# Patient Record
Sex: MALE | Race: WHITE | Employment: OTHER | URBAN - METROPOLITAN AREA
[De-identification: names, ages, dates, MRNs, and addresses within clinical notes are randomized per-mention and may not be internally consistent; named-entity substitution may affect disease eponyms.]

---

## 2017-04-27 ENCOUNTER — HOSPITAL ENCOUNTER (EMERGENCY)
Age: 57
Discharge: HOME | End: 2017-04-27
Payer: COMMERCIAL

## 2017-04-27 VITALS
SYSTOLIC BLOOD PRESSURE: 104 MMHG | OXYGEN SATURATION: 98 % | WEIGHT: 246 LBS | BODY MASS INDEX: 33.32 KG/M2 | DIASTOLIC BLOOD PRESSURE: 63 MMHG | HEART RATE: 77 BPM | RESPIRATION RATE: 16 BRPM | HEIGHT: 72 IN | TEMPERATURE: 97.9 F

## 2017-04-27 DIAGNOSIS — R11.2 NON-INTRACTABLE VOMITING WITH NAUSEA, UNSPECIFIED VOMITING TYPE: Primary | ICD-10-CM

## 2017-04-27 DIAGNOSIS — E86.0 DEHYDRATION: ICD-10-CM

## 2017-04-27 LAB
ANION GAP SERPL CALCULATED.3IONS-SCNC: 13 MEQ/L (ref 7–13)
BASOPHILS ABSOLUTE: 0.1 K/UL (ref 0–0.2)
BASOPHILS RELATIVE PERCENT: 0.8 %
BUN BLDV-MCNC: 14 MG/DL (ref 6–20)
CALCIUM SERPL-MCNC: 9.3 MG/DL (ref 8.6–10.2)
CHLORIDE BLD-SCNC: 101 MEQ/L (ref 98–107)
CK MB: 2.1 NG/ML (ref 0–6.7)
CO2: 26 MEQ/L (ref 22–29)
CREAT SERPL-MCNC: 1 MG/DL (ref 0.7–1.2)
CREATINE KINASE-MB INDEX: 1 % (ref 0–3.5)
EKG ATRIAL RATE: 71 BPM
EKG P AXIS: 37 DEGREES
EKG P-R INTERVAL: 210 MS
EKG Q-T INTERVAL: 420 MS
EKG QRS DURATION: 86 MS
EKG QTC CALCULATION (BAZETT): 456 MS
EKG R AXIS: 112 DEGREES
EKG T AXIS: 96 DEGREES
EKG VENTRICULAR RATE: 71 BPM
EOSINOPHILS ABSOLUTE: 0.3 K/UL (ref 0–0.7)
EOSINOPHILS RELATIVE PERCENT: 2.8 %
GFR AFRICAN AMERICAN: >60
GFR NON-AFRICAN AMERICAN: >60
GLUCOSE BLD-MCNC: 182 MG/DL (ref 74–109)
HCT VFR BLD CALC: 48.4 % (ref 42–52)
HEMOGLOBIN: 16.5 G/DL (ref 14–18)
LIPASE: >600 U/L (ref 13–60)
LYMPHOCYTES ABSOLUTE: 4.1 K/UL (ref 1–4.8)
LYMPHOCYTES RELATIVE PERCENT: 37.1 %
MCH RBC QN AUTO: 31.1 PG (ref 27–31.3)
MCHC RBC AUTO-ENTMCNC: 34.2 % (ref 33–37)
MCV RBC AUTO: 91.1 FL (ref 80–100)
MONOCYTES ABSOLUTE: 1.3 K/UL (ref 0.2–0.8)
MONOCYTES RELATIVE PERCENT: 11.5 %
NEUTROPHILS ABSOLUTE: 5.3 K/UL (ref 1.4–6.5)
NEUTROPHILS RELATIVE PERCENT: 47.8 %
PDW BLD-RTO: 13.2 % (ref 11.5–14.5)
PLATELET # BLD: 246 K/UL (ref 130–400)
POTASSIUM SERPL-SCNC: 3.6 MEQ/L (ref 3.5–5.1)
RBC # BLD: 5.31 M/UL (ref 4.7–6.1)
SODIUM BLD-SCNC: 140 MEQ/L (ref 132–144)
TOTAL CK: 213 U/L (ref 0–190)
TROPONIN: <0.01 NG/ML (ref 0–0.01)
WBC # BLD: 11 K/UL (ref 4.8–10.8)

## 2017-04-27 PROCEDURE — 93005 ELECTROCARDIOGRAM TRACING: CPT

## 2017-04-27 PROCEDURE — 96361 HYDRATE IV INFUSION ADD-ON: CPT

## 2017-04-27 PROCEDURE — 99284 EMERGENCY DEPT VISIT MOD MDM: CPT

## 2017-04-27 PROCEDURE — 36415 COLL VENOUS BLD VENIPUNCTURE: CPT

## 2017-04-27 PROCEDURE — 83690 ASSAY OF LIPASE: CPT

## 2017-04-27 PROCEDURE — 6360000002 HC RX W HCPCS: Performed by: EMERGENCY MEDICINE

## 2017-04-27 PROCEDURE — 2580000003 HC RX 258: Performed by: PHYSICIAN ASSISTANT

## 2017-04-27 PROCEDURE — 82553 CREATINE MB FRACTION: CPT

## 2017-04-27 PROCEDURE — 2580000003 HC RX 258: Performed by: EMERGENCY MEDICINE

## 2017-04-27 PROCEDURE — 84484 ASSAY OF TROPONIN QUANT: CPT

## 2017-04-27 PROCEDURE — 85025 COMPLETE CBC W/AUTO DIFF WBC: CPT

## 2017-04-27 PROCEDURE — 80048 BASIC METABOLIC PNL TOTAL CA: CPT

## 2017-04-27 PROCEDURE — 82550 ASSAY OF CK (CPK): CPT

## 2017-04-27 PROCEDURE — 96374 THER/PROPH/DIAG INJ IV PUSH: CPT

## 2017-04-27 RX ORDER — SODIUM CHLORIDE 0.9 % (FLUSH) 0.9 %
3 SYRINGE (ML) INJECTION EVERY 8 HOURS
Status: DISCONTINUED | OUTPATIENT
Start: 2017-04-27 | End: 2017-04-27 | Stop reason: HOSPADM

## 2017-04-27 RX ORDER — LOSARTAN POTASSIUM 25 MG/1
25 TABLET ORAL DAILY
COMMUNITY

## 2017-04-27 RX ORDER — 0.9 % SODIUM CHLORIDE 0.9 %
1500 INTRAVENOUS SOLUTION INTRAVENOUS ONCE
Status: COMPLETED | OUTPATIENT
Start: 2017-04-27 | End: 2017-04-27

## 2017-04-27 RX ORDER — ONDANSETRON 2 MG/ML
4 INJECTION INTRAMUSCULAR; INTRAVENOUS ONCE
Status: COMPLETED | OUTPATIENT
Start: 2017-04-27 | End: 2017-04-27

## 2017-04-27 RX ORDER — ONDANSETRON 4 MG/1
4 TABLET, FILM COATED ORAL EVERY 8 HOURS PRN
Qty: 10 TABLET | Refills: 0 | Status: SHIPPED | OUTPATIENT
Start: 2017-04-27

## 2017-04-27 RX ORDER — 0.9 % SODIUM CHLORIDE 0.9 %
1000 INTRAVENOUS SOLUTION INTRAVENOUS ONCE
Status: COMPLETED | OUTPATIENT
Start: 2017-04-27 | End: 2017-04-27

## 2017-04-27 RX ADMIN — SODIUM CHLORIDE 1000 ML: 9 INJECTION, SOLUTION INTRAVENOUS at 06:22

## 2017-04-27 RX ADMIN — ONDANSETRON 4 MG: 2 INJECTION, SOLUTION INTRAMUSCULAR; INTRAVENOUS at 06:20

## 2017-04-27 RX ADMIN — SODIUM CHLORIDE 1500 ML: 9 INJECTION, SOLUTION INTRAVENOUS at 06:20

## 2017-04-27 ASSESSMENT — ENCOUNTER SYMPTOMS
RHINORRHEA: 0
SORE THROAT: 0
DIARRHEA: 1
ABDOMINAL PAIN: 0
SHORTNESS OF BREATH: 0
WHEEZING: 0
EYE DISCHARGE: 0
ABDOMINAL DISTENTION: 0
VOMITING: 1
COLOR CHANGE: 0
STRIDOR: 0
COUGH: 0
CONSTIPATION: 0
BACK PAIN: 0
CHOKING: 0
NAUSEA: 1

## 2020-01-13 ENCOUNTER — APPOINTMENT (OUTPATIENT)
Dept: GENERAL RADIOLOGY | Age: 60
DRG: 871 | End: 2020-01-13
Payer: MEDICARE

## 2020-01-13 ENCOUNTER — HOSPITAL ENCOUNTER (INPATIENT)
Age: 60
LOS: 1 days | Discharge: HOME | DRG: 871 | End: 2020-01-14
Attending: INTERNAL MEDICINE | Admitting: INTERNAL MEDICINE
Payer: MEDICARE

## 2020-01-13 ENCOUNTER — APPOINTMENT (OUTPATIENT)
Dept: CT IMAGING | Age: 60
DRG: 871 | End: 2020-01-13
Payer: MEDICARE

## 2020-01-13 PROBLEM — J18.9 PNEUMONIA: Status: ACTIVE | Noted: 2020-01-13

## 2020-01-13 LAB
ALBUMIN SERPL-MCNC: 2.9 G/DL (ref 3.5–4.6)
ALP BLD-CCNC: 61 U/L (ref 35–104)
ALT SERPL-CCNC: 42 U/L (ref 0–41)
ANION GAP SERPL CALCULATED.3IONS-SCNC: 13 MEQ/L (ref 9–15)
AST SERPL-CCNC: 65 U/L (ref 0–40)
BASOPHILS ABSOLUTE: 0 K/UL (ref 0–0.2)
BASOPHILS RELATIVE PERCENT: 0.3 %
BILIRUB SERPL-MCNC: 0.8 MG/DL (ref 0.2–0.7)
BUN BLDV-MCNC: 19 MG/DL (ref 6–20)
CALCIUM SERPL-MCNC: 8.1 MG/DL (ref 8.5–9.9)
CHLORIDE BLD-SCNC: 93 MEQ/L (ref 95–107)
CO2: 29 MEQ/L (ref 20–31)
CREAT SERPL-MCNC: 1.06 MG/DL (ref 0.7–1.2)
EKG ATRIAL RATE: 91 BPM
EKG P AXIS: 53 DEGREES
EKG P-R INTERVAL: 200 MS
EKG Q-T INTERVAL: 366 MS
EKG QRS DURATION: 82 MS
EKG QTC CALCULATION (BAZETT): 450 MS
EKG R AXIS: 116 DEGREES
EKG T AXIS: 44 DEGREES
EKG VENTRICULAR RATE: 91 BPM
EOSINOPHILS ABSOLUTE: 0 K/UL (ref 0–0.7)
EOSINOPHILS RELATIVE PERCENT: 0 %
GFR AFRICAN AMERICAN: >60
GFR NON-AFRICAN AMERICAN: >60
GLOBULIN: 3.5 G/DL (ref 2.3–3.5)
GLUCOSE BLD-MCNC: 251 MG/DL (ref 60–115)
GLUCOSE BLD-MCNC: 265 MG/DL (ref 70–99)
GLUCOSE BLD-MCNC: 366 MG/DL (ref 60–115)
HCT VFR BLD CALC: 51.3 % (ref 42–52)
HEMOGLOBIN: 17.5 G/DL (ref 14–18)
INFLUENZA A BY PCR: POSITIVE
INFLUENZA B BY PCR: NEGATIVE
LACTIC ACID: 2.3 MMOL/L (ref 0.5–2.2)
LACTIC ACID: 2.8 MMOL/L (ref 0.5–2.2)
LYMPHOCYTES ABSOLUTE: 0.7 K/UL (ref 1–4.8)
LYMPHOCYTES RELATIVE PERCENT: 14 %
MAGNESIUM: 1.9 MG/DL (ref 1.7–2.4)
MCH RBC QN AUTO: 31.3 PG (ref 27–31.3)
MCHC RBC AUTO-ENTMCNC: 34 % (ref 33–37)
MCV RBC AUTO: 92.1 FL (ref 80–100)
MONOCYTES ABSOLUTE: 0.4 K/UL (ref 0.2–0.8)
MONOCYTES RELATIVE PERCENT: 8.2 %
NEUTROPHILS ABSOLUTE: 4 K/UL (ref 1.4–6.5)
NEUTROPHILS RELATIVE PERCENT: 77.5 %
PDW BLD-RTO: 13.5 % (ref 11.5–14.5)
PERFORMED ON: ABNORMAL
PERFORMED ON: ABNORMAL
PLATELET # BLD: 102 K/UL (ref 130–400)
POTASSIUM SERPL-SCNC: 3.9 MEQ/L (ref 3.4–4.9)
RBC # BLD: 5.57 M/UL (ref 4.7–6.1)
REASON FOR REJECTION: NORMAL
REJECTED TEST: NORMAL
SODIUM BLD-SCNC: 135 MEQ/L (ref 135–144)
TOTAL PROTEIN: 6.4 G/DL (ref 6.3–8)
TROPONIN: <0.01 NG/ML (ref 0–0.01)
WBC # BLD: 5.1 K/UL (ref 4.8–10.8)

## 2020-01-13 PROCEDURE — 71045 X-RAY EXAM CHEST 1 VIEW: CPT

## 2020-01-13 PROCEDURE — 6360000002 HC RX W HCPCS: Performed by: PHYSICIAN ASSISTANT

## 2020-01-13 PROCEDURE — 96368 THER/DIAG CONCURRENT INF: CPT

## 2020-01-13 PROCEDURE — 93005 ELECTROCARDIOGRAM TRACING: CPT | Performed by: EMERGENCY MEDICINE

## 2020-01-13 PROCEDURE — 96372 THER/PROPH/DIAG INJ SC/IM: CPT

## 2020-01-13 PROCEDURE — 6370000000 HC RX 637 (ALT 250 FOR IP): Performed by: PHYSICIAN ASSISTANT

## 2020-01-13 PROCEDURE — 2580000003 HC RX 258: Performed by: PHYSICIAN ASSISTANT

## 2020-01-13 PROCEDURE — 96365 THER/PROPH/DIAG IV INF INIT: CPT

## 2020-01-13 PROCEDURE — 96375 TX/PRO/DX INJ NEW DRUG ADDON: CPT

## 2020-01-13 PROCEDURE — 36415 COLL VENOUS BLD VENIPUNCTURE: CPT

## 2020-01-13 PROCEDURE — 87502 INFLUENZA DNA AMP PROBE: CPT

## 2020-01-13 PROCEDURE — 99285 EMERGENCY DEPT VISIT HI MDM: CPT

## 2020-01-13 PROCEDURE — 85025 COMPLETE CBC W/AUTO DIFF WBC: CPT

## 2020-01-13 PROCEDURE — 83735 ASSAY OF MAGNESIUM: CPT

## 2020-01-13 PROCEDURE — 70450 CT HEAD/BRAIN W/O DYE: CPT

## 2020-01-13 PROCEDURE — G0378 HOSPITAL OBSERVATION PER HR: HCPCS

## 2020-01-13 PROCEDURE — 84484 ASSAY OF TROPONIN QUANT: CPT

## 2020-01-13 PROCEDURE — 80053 COMPREHEN METABOLIC PANEL: CPT

## 2020-01-13 PROCEDURE — 94664 DEMO&/EVAL PT USE INHALER: CPT

## 2020-01-13 PROCEDURE — 2580000003 HC RX 258: Performed by: INTERNAL MEDICINE

## 2020-01-13 PROCEDURE — 6370000000 HC RX 637 (ALT 250 FOR IP): Performed by: INTERNAL MEDICINE

## 2020-01-13 PROCEDURE — 87040 BLOOD CULTURE FOR BACTERIA: CPT

## 2020-01-13 PROCEDURE — 6360000002 HC RX W HCPCS: Performed by: INTERNAL MEDICINE

## 2020-01-13 PROCEDURE — 83605 ASSAY OF LACTIC ACID: CPT

## 2020-01-13 PROCEDURE — 1210000000 HC MED SURG R&B

## 2020-01-13 PROCEDURE — 96374 THER/PROPH/DIAG INJ IV PUSH: CPT

## 2020-01-13 RX ORDER — IPRATROPIUM BROMIDE AND ALBUTEROL SULFATE 2.5; .5 MG/3ML; MG/3ML
1 SOLUTION RESPIRATORY (INHALATION)
Status: DISCONTINUED | OUTPATIENT
Start: 2020-01-14 | End: 2020-01-13

## 2020-01-13 RX ORDER — IPRATROPIUM BROMIDE AND ALBUTEROL SULFATE 2.5; .5 MG/3ML; MG/3ML
1 SOLUTION RESPIRATORY (INHALATION) EVERY 4 HOURS PRN
Status: DISCONTINUED | OUTPATIENT
Start: 2020-01-13 | End: 2020-01-14 | Stop reason: HOSPADM

## 2020-01-13 RX ORDER — DEXTROSE MONOHYDRATE 25 G/50ML
12.5 INJECTION, SOLUTION INTRAVENOUS PRN
Status: DISCONTINUED | OUTPATIENT
Start: 2020-01-13 | End: 2020-01-14 | Stop reason: HOSPADM

## 2020-01-13 RX ORDER — SODIUM CHLORIDE 0.9 % (FLUSH) 0.9 %
10 SYRINGE (ML) INJECTION PRN
Status: DISCONTINUED | OUTPATIENT
Start: 2020-01-13 | End: 2020-01-14 | Stop reason: HOSPADM

## 2020-01-13 RX ORDER — SODIUM CHLORIDE, SODIUM LACTATE, POTASSIUM CHLORIDE, CALCIUM CHLORIDE 600; 310; 30; 20 MG/100ML; MG/100ML; MG/100ML; MG/100ML
INJECTION, SOLUTION INTRAVENOUS CONTINUOUS
Status: DISCONTINUED | OUTPATIENT
Start: 2020-01-13 | End: 2020-01-14

## 2020-01-13 RX ORDER — ACETAMINOPHEN 500 MG
1000 TABLET ORAL ONCE
Status: COMPLETED | OUTPATIENT
Start: 2020-01-13 | End: 2020-01-13

## 2020-01-13 RX ORDER — ACETAMINOPHEN 325 MG/1
650 TABLET ORAL EVERY 4 HOURS PRN
Status: DISCONTINUED | OUTPATIENT
Start: 2020-01-13 | End: 2020-01-14 | Stop reason: HOSPADM

## 2020-01-13 RX ORDER — GUAIFENESIN 600 MG/1
600 TABLET, EXTENDED RELEASE ORAL 2 TIMES DAILY
Status: DISCONTINUED | OUTPATIENT
Start: 2020-01-13 | End: 2020-01-14 | Stop reason: HOSPADM

## 2020-01-13 RX ORDER — NICOTINE POLACRILEX 4 MG
15 LOZENGE BUCCAL PRN
Status: DISCONTINUED | OUTPATIENT
Start: 2020-01-13 | End: 2020-01-14 | Stop reason: HOSPADM

## 2020-01-13 RX ORDER — AZITHROMYCIN 250 MG/1
250 TABLET, FILM COATED ORAL DAILY
Status: DISCONTINUED | OUTPATIENT
Start: 2020-01-14 | End: 2020-01-13

## 2020-01-13 RX ORDER — SODIUM CHLORIDE 0.9 % (FLUSH) 0.9 %
10 SYRINGE (ML) INJECTION EVERY 12 HOURS SCHEDULED
Status: DISCONTINUED | OUTPATIENT
Start: 2020-01-13 | End: 2020-01-14 | Stop reason: HOSPADM

## 2020-01-13 RX ORDER — DEXTROSE MONOHYDRATE 50 MG/ML
100 INJECTION, SOLUTION INTRAVENOUS PRN
Status: DISCONTINUED | OUTPATIENT
Start: 2020-01-13 | End: 2020-01-14 | Stop reason: HOSPADM

## 2020-01-13 RX ORDER — OSELTAMIVIR PHOSPHATE 75 MG/1
75 CAPSULE ORAL 2 TIMES DAILY
Status: DISCONTINUED | OUTPATIENT
Start: 2020-01-13 | End: 2020-01-14 | Stop reason: HOSPADM

## 2020-01-13 RX ORDER — ONDANSETRON 2 MG/ML
4 INJECTION INTRAMUSCULAR; INTRAVENOUS EVERY 6 HOURS PRN
Status: DISCONTINUED | OUTPATIENT
Start: 2020-01-13 | End: 2020-01-14 | Stop reason: HOSPADM

## 2020-01-13 RX ORDER — 0.9 % SODIUM CHLORIDE 0.9 %
1000 INTRAVENOUS SOLUTION INTRAVENOUS ONCE
Status: COMPLETED | OUTPATIENT
Start: 2020-01-13 | End: 2020-01-14

## 2020-01-13 RX ORDER — BENZONATATE 100 MG/1
100 CAPSULE ORAL 3 TIMES DAILY PRN
Status: DISCONTINUED | OUTPATIENT
Start: 2020-01-13 | End: 2020-01-14 | Stop reason: HOSPADM

## 2020-01-13 RX ORDER — KETOROLAC TROMETHAMINE 30 MG/ML
30 INJECTION, SOLUTION INTRAMUSCULAR; INTRAVENOUS ONCE
Status: COMPLETED | OUTPATIENT
Start: 2020-01-13 | End: 2020-01-13

## 2020-01-13 RX ORDER — ASPIRIN 81 MG/1
81 TABLET ORAL DAILY
Status: DISCONTINUED | OUTPATIENT
Start: 2020-01-13 | End: 2020-01-14 | Stop reason: HOSPADM

## 2020-01-13 RX ADMIN — OSELTAMIVIR PHOSPHATE 75 MG: 75 CAPSULE ORAL at 20:49

## 2020-01-13 RX ADMIN — ENOXAPARIN SODIUM 40 MG: 40 INJECTION SUBCUTANEOUS at 20:49

## 2020-01-13 RX ADMIN — SODIUM CHLORIDE 1000 ML: 9 INJECTION, SOLUTION INTRAVENOUS at 15:54

## 2020-01-13 RX ADMIN — SODIUM CHLORIDE, POTASSIUM CHLORIDE, SODIUM LACTATE AND CALCIUM CHLORIDE: 600; 310; 30; 20 INJECTION, SOLUTION INTRAVENOUS at 20:55

## 2020-01-13 RX ADMIN — AZITHROMYCIN DIHYDRATE 500 MG: 500 INJECTION, POWDER, LYOPHILIZED, FOR SOLUTION INTRAVENOUS at 18:03

## 2020-01-13 RX ADMIN — GUAIFENESIN 600 MG: 600 TABLET, EXTENDED RELEASE ORAL at 20:49

## 2020-01-13 RX ADMIN — Medication 10 ML: at 20:50

## 2020-01-13 RX ADMIN — ASPIRIN 81 MG: 81 TABLET, COATED ORAL at 20:48

## 2020-01-13 RX ADMIN — CEFTRIAXONE SODIUM 1 G: 1 INJECTION, POWDER, FOR SOLUTION INTRAMUSCULAR; INTRAVENOUS at 17:39

## 2020-01-13 RX ADMIN — KETOROLAC TROMETHAMINE 30 MG: 30 INJECTION, SOLUTION INTRAMUSCULAR at 15:52

## 2020-01-13 RX ADMIN — ACETAMINOPHEN 1000 MG: 500 TABLET ORAL at 15:52

## 2020-01-13 ASSESSMENT — PAIN SCALES - GENERAL
PAINLEVEL_OUTOF10: 0
PAINLEVEL_OUTOF10: 8
PAINLEVEL_OUTOF10: 4

## 2020-01-13 ASSESSMENT — ENCOUNTER SYMPTOMS
APNEA: 0
BACK PAIN: 0
VOICE CHANGE: 0
COUGH: 1
ABDOMINAL DISTENTION: 0
EYE DISCHARGE: 0
ANAL BLEEDING: 0
NAUSEA: 0
PHOTOPHOBIA: 0
VOMITING: 0

## 2020-01-13 NOTE — H&P
Hospital Medicine  History and Physical    Patient:  Krys Simpson  MRN: [id number]    CHIEF COMPLAINT:    Chief Complaint   Patient presents with    Fatigue     recent flu dx, not improving       History Obtained From:  Patient, EMR  Primary Care Physician: Prakash Gautam    HISTORY OF PRESENT ILLNESS:   This is a 52-year-old male with a past medical history of type 2 diabetes and hypertension who presents with fatigue, shortness of breath, and persistent cough. Patient was seen almost a week ago at urgent care and was worked up for influenza and was negative at that time. Patient states his symptoms started that day with myalgias, cough, nasal congestion, and extreme fatigue. He states that his son was sick at home a few days prior to him becoming ill. States he has not recovered since that time. Today he was brought in by wife because he had a syncopal episode at home. Patient was trying to get something out of the refrigerator and became lightheaded and dizzy and passed out. He did not hit his head and fall was witnessed per family was at bedside. CT brain was obtained and was negative. Patient was tested for influenza again and this time is flu a positive. He also has a left upper and lower lobe infiltrate and significant amount of wheezing. Per family, he has been quite slow to respond to questions and conversations throughout the day today. This is not his baseline. He is alert and oriented x3 for me, but does seem rather slow to respond. He admits to persistent fever and chills throughout the day today. He admits to a dry cough. He will be admitted for further evaluation and treatment of his symptoms.       Past Medical History:      Diagnosis Date    [facility] mellitus ([facility] [address]     Hypertension     TBI (traumatic brain injury) ([facility] [address]        Past Surgical History:      Procedure Laterality Date    APPENDECTOMY      BACK SURGERY      x 3    TONSILLECTOMY         Medications Prior to Admission: found for: PHART, PO2ART, [id number]  INR: No results for input(s): INR in the last 72 hours. URINALYSIS:No results for input(s): NITRITE, COLORU, PHUR, LABCAST, WBCUA, RBCUA, MUCUS, TRICHOMONAS, YEAST, BACTERIA, CLARITYU, SPECGRAV, LEUKOCYTESUR, UROBILINOGEN, BILIRUBINUR, BLOODU, GLUCOSEU, AMORPHOUS in the last 72 hours. Invalid input(s): KETONESU  -----------------------------------------------------------------   Ct Head Wo Contrast    Result Date: 1/13/2020  EXAMINATION: CT of the brain without contrast HISTORY: Syncopal episode resulting in fall striking back of head. COMPARISON: CT brain from April 15, 2016 TECHNIQUE: Multiple contiguous axial images were obtained of the brain from the skull base through the vertex. Multiplanar reformats were obtained. FINDINGS: Prominence of the sulci and ventricles compatible with moderate generalized parenchymal volume loss. Gray-white matter differentiation is preserved. Areas of bilateral supratentorial white matter hypoattenuation are nonspecific but most likely related to  chronic small vessel ischemic changes in a patient of this age. Unchanged encephalomalacia of the right frontal lobe and left parietal lobe. Cavum of septum pellucidum noted. No acute hemorrhage or abnormal extra-axial fluid collection. No mass effect or midline shift. Negative cisterna magna again noted. The visualized paranasal sinuses and mastoid air cells are clear. Calvarium is intact. No acute intracranial process or significant interval change. All CT scans at this facility use dose modulation, iterative reconstruction, and/or weight based dosing when appropriate to reduce radiation dose to as low as reasonably achievable. Xr Chest Portable    Result Date: 1/13/2020  Portable chest radiograph History: Fever and fatigue. Technique: AP portable view of the chest obtained. Comparison: Portable chest radiograph from April 15, 2016 Findings: Suboptimal inspiration.  The cardiomediastinal

## 2020-01-13 NOTE — ED NOTES
Blood glucose checked per pt's wife's request. Glucose 251.      Kandis Mccartney, LEANA  01/13/20 1373

## 2020-01-13 NOTE — ED PROVIDER NOTES
recognition program.  Efforts were made to edit the dictations but occasionally words are mis-transcribed.)    Zoila Cho PA-C (electronically signed)  Attending Emergency Physician       Zoila Cho PA-C  01/13/20 1131 [address] [place] Kimberly, PA-C  01/14/20 0677

## 2020-01-14 VITALS
TEMPERATURE: 100.4 F | DIASTOLIC BLOOD PRESSURE: 77 MMHG | BODY MASS INDEX: 32.47 KG/M2 | HEIGHT: 73 IN | HEART RATE: 101 BPM | RESPIRATION RATE: 16 BRPM | SYSTOLIC BLOOD PRESSURE: 130 MMHG | WEIGHT: 245 LBS | OXYGEN SATURATION: 94 %

## 2020-01-14 LAB
ANION GAP SERPL CALCULATED.3IONS-SCNC: 11 MEQ/L (ref 9–15)
BASOPHILS ABSOLUTE: 0 K/UL (ref 0–0.2)
BASOPHILS RELATIVE PERCENT: 0.8 %
BUN BLDV-MCNC: 22 MG/DL (ref 6–20)
CALCIUM SERPL-MCNC: 7.4 MG/DL (ref 8.5–9.9)
CHLORIDE BLD-SCNC: 93 MEQ/L (ref 95–107)
CO2: 28 MEQ/L (ref 20–31)
CREAT SERPL-MCNC: 1.13 MG/DL (ref 0.7–1.2)
EOSINOPHILS ABSOLUTE: 0 K/UL (ref 0–0.7)
EOSINOPHILS RELATIVE PERCENT: 0 %
GFR AFRICAN AMERICAN: >60
GFR NON-AFRICAN AMERICAN: >60
GLUCOSE BLD-MCNC: 217 MG/DL (ref 70–99)
GLUCOSE BLD-MCNC: 225 MG/DL (ref 60–115)
GLUCOSE BLD-MCNC: 262 MG/DL (ref 60–115)
HCT VFR BLD CALC: 46.3 % (ref 42–52)
HEMOGLOBIN: 16.1 G/DL (ref 14–18)
LACTIC ACID: 1.7 MMOL/L (ref 0.5–2.2)
LV EF: 55 %
LVEF MODALITY: NORMAL
LYMPHOCYTES ABSOLUTE: 0.7 K/UL (ref 1–4.8)
LYMPHOCYTES RELATIVE PERCENT: 16.2 %
MAGNESIUM: 1.8 MG/DL (ref 1.7–2.4)
MCH RBC QN AUTO: 31.9 PG (ref 27–31.3)
MCHC RBC AUTO-ENTMCNC: 34.9 % (ref 33–37)
MCV RBC AUTO: 91.6 FL (ref 80–100)
MONOCYTES ABSOLUTE: 0.3 K/UL (ref 0.2–0.8)
MONOCYTES RELATIVE PERCENT: 6.8 %
NEUTROPHILS ABSOLUTE: 3.2 K/UL (ref 1.4–6.5)
NEUTROPHILS RELATIVE PERCENT: 76.2 %
PDW BLD-RTO: 13.2 % (ref 11.5–14.5)
PERFORMED ON: ABNORMAL
PERFORMED ON: ABNORMAL
PLATELET # BLD: 91 K/UL (ref 130–400)
PLATELET SLIDE REVIEW: ABNORMAL
POTASSIUM REFLEX MAGNESIUM: 3.4 MEQ/L (ref 3.4–4.9)
RBC # BLD: 5.06 M/UL (ref 4.7–6.1)
SODIUM BLD-SCNC: 132 MEQ/L (ref 135–144)
WBC # BLD: 4.3 K/UL (ref 4.8–10.8)

## 2020-01-14 PROCEDURE — 83605 ASSAY OF LACTIC ACID: CPT

## 2020-01-14 PROCEDURE — 6370000000 HC RX 637 (ALT 250 FOR IP): Performed by: INTERNAL MEDICINE

## 2020-01-14 PROCEDURE — 80048 BASIC METABOLIC PNL TOTAL CA: CPT

## 2020-01-14 PROCEDURE — 85025 COMPLETE CBC W/AUTO DIFF WBC: CPT

## 2020-01-14 PROCEDURE — 36415 COLL VENOUS BLD VENIPUNCTURE: CPT

## 2020-01-14 PROCEDURE — G0378 HOSPITAL OBSERVATION PER HR: HCPCS

## 2020-01-14 PROCEDURE — 2580000003 HC RX 258: Performed by: INTERNAL MEDICINE

## 2020-01-14 PROCEDURE — 99222 1ST HOSP IP/OBS MODERATE 55: CPT | Performed by: INTERNAL MEDICINE

## 2020-01-14 PROCEDURE — 83735 ASSAY OF MAGNESIUM: CPT

## 2020-01-14 PROCEDURE — 93010 ELECTROCARDIOGRAM REPORT: CPT | Performed by: INTERNAL MEDICINE

## 2020-01-14 PROCEDURE — 93306 TTE W/DOPPLER COMPLETE: CPT

## 2020-01-14 RX ORDER — OSELTAMIVIR PHOSPHATE 75 MG/1
75 CAPSULE ORAL 2 TIMES DAILY
Qty: 20 CAPSULE | Refills: 0 | Status: SHIPPED | OUTPATIENT
Start: 2020-01-14 | End: 2020-01-24

## 2020-01-14 RX ORDER — LEVOFLOXACIN 750 MG/1
750 TABLET ORAL DAILY
Qty: 10 TABLET | Refills: 0 | Status: SHIPPED | OUTPATIENT
Start: 2020-01-14 | End: 2020-01-24

## 2020-01-14 RX ORDER — GUAIFENESIN 600 MG/1
600 TABLET, EXTENDED RELEASE ORAL 2 TIMES DAILY
Qty: 20 TABLET | Refills: 0 | Status: SHIPPED | OUTPATIENT
Start: 2020-01-14

## 2020-01-14 RX ORDER — BENZONATATE 100 MG/1
100 CAPSULE ORAL 3 TIMES DAILY PRN
Qty: 30 CAPSULE | Refills: 0 | Status: SHIPPED | OUTPATIENT
Start: 2020-01-14 | End: 2020-01-24

## 2020-01-14 RX ADMIN — ASPIRIN 81 MG: 81 TABLET, COATED ORAL at 09:27

## 2020-01-14 RX ADMIN — ACETAMINOPHEN 650 MG: 325 TABLET ORAL at 10:03

## 2020-01-14 RX ADMIN — INSULIN LISPRO 2 UNITS: 100 INJECTION, SOLUTION INTRAVENOUS; SUBCUTANEOUS at 12:30

## 2020-01-14 RX ADMIN — SODIUM CHLORIDE, POTASSIUM CHLORIDE, SODIUM LACTATE AND CALCIUM CHLORIDE: 600; 310; 30; 20 INJECTION, SOLUTION INTRAVENOUS at 09:38

## 2020-01-14 RX ADMIN — INSULIN LISPRO 3 UNITS: 100 INJECTION, SOLUTION INTRAVENOUS; SUBCUTANEOUS at 09:32

## 2020-01-14 RX ADMIN — OSELTAMIVIR PHOSPHATE 75 MG: 75 CAPSULE ORAL at 09:27

## 2020-01-14 RX ADMIN — GUAIFENESIN 600 MG: 600 TABLET, EXTENDED RELEASE ORAL at 09:27

## 2020-01-14 RX ADMIN — Medication 10 ML: at 09:28

## 2020-01-14 RX ADMIN — BENZONATATE 100 MG: 100 CAPSULE ORAL at 02:27

## 2020-01-14 ASSESSMENT — ENCOUNTER SYMPTOMS
VOICE CHANGE: 0
COLOR CHANGE: 0
NAUSEA: 0
TROUBLE SWALLOWING: 0
DIARRHEA: 0
CHEST TIGHTNESS: 0
BLOOD IN STOOL: 0
ABDOMINAL DISTENTION: 0
WHEEZING: 0
VOMITING: 0
FACIAL SWELLING: 0
APNEA: 0
SHORTNESS OF BREATH: 0
ANAL BLEEDING: 0

## 2020-01-14 ASSESSMENT — PAIN SCALES - GENERAL: PAINLEVEL_OUTOF10: 0

## 2020-01-14 NOTE — PROGRESS NOTES
[facility] EMERGENCY [facility] AT [place] Respiratory Therapy Evaluation   Current Order:  Duoneb Q4 [facility] Regimen: None      Ordering Physician: Chauncey Pacheco  Re-evaluation Date:  N/A     Diagnosis: Flu      Patient Status: Stable / Unstable + Physician notified    The following MDI Criteria must be met in order to convert aerosol to MDI with spacer. If unable to meet, MDI will be converted to aerosol:  []  Patient able to demonstrate the ability to use MDI effectively  []  Patient alert and cooperative  []  Patient able to take deep breath with 5-10 second hold  []  Medication(s) available in this delivery method   []  Peak flow greater than or equal to 200 ml/min            Current Order Substituted To  (same drug, same frequency)   Aerosol to MDI [] Albuterol Sulfate 0.083% unit dose by aerosol Albuterol Sulfate MDI 2 puffs by inhalation with spacer    [] Levalbuterol 1.25 mg unit dose by aerosol Levalbuterol MDI 2 puffs by inhalation with spacer    [] Levalbuterol 0.63 mg unit dose by aerosol Levalbuterol MDI 2 puffs by inhalation with spacer    [] Ipratropium Bromide 0.02% unit dose by aerosol Ipratropium Bromide MDI 2 puffs by inhalation with spacer    [] Duoneb (Ipratropium + Albuterol) unit dose by aerosol Ipratropium MDI + Albuterol MDI 2 puffs by inhalation w/spacer   MDI to Aerosol [] Albuterol Sulfate MDI Albuterol Sulfate 0.083% unit dose by aerosol    [] Levalbuterol MDI 2 puffs by inhalation Levalbuterol 1.25 mg unit dose by aerosol    [] Ipratropium Bromide MDI by inhalation Ipratropium Bromide 0.02% unit dose by aerosol    [] Combivent (Ipratropium + Albuterol) MDI by inhalation Duoneb (Ipratropium + Albuterol) unit dose by aerosol   Treatment Assessment [Frequency/Schedule]:  Change frequency to: ___Duoneb Q4 PRN_______________________________________________per Protocol, P&T, MEC      Points 0 1 2 3 4   Pulmonary Status  Non-Smoker  [x]   Smoking history   < 20 pack years  []   Smoking history  ?  20 pack years  []   Pulmonary

## 2020-01-14 NOTE — CONSULTS
Consult Note  Patient: Bobbi \[facility]\""  Unit/Bed: V912/Z439-23  YOB: 1960  MRN: [id number]  Acct: [de-identified]   Admitting Diagnosis: Pneumonia [J18.9]  Date:  1/13/2020  Hospital Day: 1      Chief Complaint:  SOB    History of Present Illness: Was seen about a week ago for flulike symptoms. She tested negative. Comes back with recurrent fevers chest pain shortness of breath. Multiple risk factors coronary artery disease.   He was also dizzy but did not completely pass out      No Known Allergies    Current Facility-Administered Medications   Medication Dose Route Frequency Provider Last Rate Last Dose    sodium chloride flush 0.9 % injection 10 mL  10 mL Intravenous 2 times per day Lottie Moore, DO   10 mL at 01/13/20 2050    sodium chloride flush 0.9 % injection 10 mL  10 mL Intravenous PRN Lottie Moore, DO        magnesium hydroxide (MILK OF MAGNESIA) 400 MG/5ML suspension 30 mL  30 mL Oral Daily PRN Lottie Willamses, DO        [facility] injection 4 mg  4 mg Intravenous Q6H PRN Lottie Dimes, DO        enoxaparin (LOVENOX) injection 40 mg  40 mg Subcutaneous Daily Lottie Dimes, DO   40 mg at 01/13/20 2049    lactated ringers infusion   Intravenous Continuous Lottie Dimes, DO 75 mL/hr at 01/13/20 2055      acetaminophen (TYLENOL) tablet 650 mg  650 mg Oral Q4H PRN Lottie Dimes, DO        cefTRIAXone (ROCEPHIN) 1 g IVPB in 50 mL D5W minibag  1 g Intravenous Q24H Lottie Willamses, DO        oseltamivir (TAMIFLU) capsule 75 mg  75 mg Oral BID Lottie Dimes, DO   75 mg at 01/13/20 2049    glucose (GLUTOSE) 40 % oral gel 15 g  15 g Oral PRN Lottie Dimes, DO        dextrose 50 % IV solution  12.5 g Intravenous PRN Lottie Dimes, DO        glucagon (rDNA) injection 1 mg  1 mg Intramuscular PRN Lottie Dimes, DO        dextrose 5 % solution  100 mL/hr Intravenous PRN Lottie Dimes, DO        aspirin EC tablet 81 mg  81 mg Oral Daily Lottie Dimes, DO   81 mg at 01/13/20 2048    azithromycin Physically abused: None     Forced sexual activity: None   Other Topics Concern    None   Social History Narrative    None       Family Hx:  History reviewed. No pertinent family history. Review of Systems:   Review of Systems   Constitutional: Negative for appetite change, diaphoresis and fatigue. Respiratory: Negative for apnea, chest tightness and shortness of breath. Cardiovascular: Negative for chest pain, palpitations and leg swelling. Gastrointestinal: Negative for abdominal distention, diarrhea, nausea and vomiting. Skin: Negative for color change, pallor, rash and wound. Neurological: Negative for dizziness, syncope, weakness, light-headedness, numbness and headaches. Psychiatric/Behavioral: Negative for agitation, behavioral problems and confusion. The patient is not nervous/anxious and is not hyperactive. All other systems reviewed and are negative. Physical Examination:    /77   Pulse 101   Temp 97.8 °F (36.6 °C) (Oral)   Resp 16   Ht 6' 1\" (1.854 m)   Wt 245 lb (111.1 kg)   SpO2 94%   BMI 32.32 kg/m²    Physical Exam  Constitutional:       Appearance: He is well-developed. HENT:      Head: Atraumatic. Eyes:      Conjunctiva/sclera: Conjunctivae normal.      Pupils: Pupils are equal, round, and reactive to light. Neck:      Thyroid: No thyromegaly. Vascular: No JVD. Trachea: No tracheal deviation. Cardiovascular:      Rate and Rhythm: Normal rate and regular rhythm. Heart sounds: No murmur. No friction rub. No gallop. Pulmonary:      Effort: No respiratory distress. Breath sounds: No wheezing or rales. Chest:      Chest wall: No tenderness. Abdominal:      General: There is no distension. Palpations: Abdomen is soft. There is no mass. Tenderness: There is no tenderness. There is no guarding or rebound. Musculoskeletal: Normal range of motion. Lymphadenopathy:      Cervical: No cervical adenopathy.    Skin: from April 15, 2016 TECHNIQUE: Multiple contiguous axial images were obtained of the brain from the skull base through the vertex. Multiplanar reformats were obtained. FINDINGS: Prominence of the sulci and ventricles compatible with moderate generalized parenchymal volume loss. Gray-white matter differentiation is preserved. Areas of bilateral supratentorial white matter hypoattenuation are nonspecific but most likely related to  chronic small vessel ischemic changes in a patient of this age. Unchanged encephalomalacia of the right frontal lobe and left parietal lobe. Cavum of septum pellucidum noted. No acute hemorrhage or abnormal extra-axial fluid collection. No mass effect or midline shift. Negative cisterna magna again noted. The visualized paranasal sinuses and mastoid air cells are clear. Calvarium is intact. No acute intracranial process or significant interval change. All CT scans at this facility use dose modulation, iterative reconstruction, and/or weight based dosing when appropriate to reduce radiation dose to as low as reasonably achievable. Xr Chest Portable    Result Date: 1/13/2020  Portable chest radiograph History: Fever and fatigue. Technique: AP portable view of the chest obtained. Comparison: Portable chest radiograph from April 15, 2016 Findings: Suboptimal inspiration. The cardiomediastinal silhouette is within normal limits. Patchy left lung base opacities. No pneumothorax. No pleural effusion. Bones of the thorax appear intact. Postsurgical changes of cervical spine fusion. Patchy left lung base opacities may relate to atelectasis or minimal infiltrate. EKG:      Assessment:    Active Hospital Problems    Diagnosis Date Noted    Pneumonia [J18.9] 01/13/2020          Plan:  1. Will get an echocardiogram.  Evaluate for LV function.             Electronically signed by Destiny Miner MD on 1/14/2020 at 9:07 AM

## 2020-01-14 NOTE — DISCHARGE SUMMARY
Physician Discharge Summary     Patient ID:  Jeanne Donis  [id number]  73 y.o.  1960    Admit date: 1/13/2020    Discharge date : 01/14/20     Admitting Physician: Tyson Meadows DO     Discharge Physician: Tyson Meadows DO     Admission Diagnoses: Pneumonia [J18.9]    Discharge Diagnoses: Community-acquired pneumonia     Admission Condition: fair    Discharged Condition: good    Hospital Course: Mr. Domenica Vines was admitted to the hospital for influenza A and pneumonia on 1/13/20. He did meet criteria for severe sepsis on admission, but symptoms dramatically improved. He had a presyncopal episode at home and therefore he was evaluated by pulmonology and cardiology. Cardiology feels this presyncopal event was related to his acute illness, but Echocardiogram has been ordered for sake of completeness. The results are pending, but can be followed up by PCP, Dr. Marissa Manning, within the next 5-7 days. Patient is feeling better and is very motivated to return home. He will be give a prescription for oral levaquin and tamiflu upon discharge. Again, he will need to follow up with his PCP within the next 5-7 to ensure resolution of symptoms, and for follow up of Echo and blood cultures. Consults: cardiology and pulmonary/intensive care    Significant Diagnostic Studies: See below. Discharge Exam:  See progress note 01/14/20      Labs:   Recent Labs     01/13/20  1515 01/14/20  0640   WBC 5.1 4.3*   HGB 17.5 16.1   HCT 51.3 46.3   * 91*     Recent Labs     01/13/20  1555 01/14/20  0640    132*   K 3.9 3.4   CL 93* 93*   CO2 29 28   BUN 19 22*   CREATININE 1.06 1.13   CALCIUM 8.1* 7.4*     Recent Labs     01/13/20  1555   AST 65*   ALT 42*   BILITOT 0.8*   ALKPHOS 61     No results for input(s): INR in the last 72 hours.   Recent Labs     01/13/20  1515   TROPONINI <0.010       Urinalysis:   Lab Results   Component Value Date    NITRU Negative 12/15/2014    BLOODU Negative 12/15/2014    SPECGRAV 1.016 aspirin 81 MG tablet Take 81 mg by mouth daily      metFORMIN (GLUCOPHAGE) 500 MG tablet Take 1,000 mg by mouth 2 times daily (with meals)      ondansetron (ZOFRAN) 4 MG tablet Take 1 tablet by mouth every 8 hours as needed for Nausea  Qty: 10 tablet, Refills: 0           Activity: activity as tolerated       Follow-up with PCP in one week.     DC time 35 minutes    Signed:  Electronically signed by Babak Talavera DO on 1/14/2020 at 12:07 PM

## 2020-01-14 NOTE — PROGRESS NOTES
Physical Therapy Missed Treatment   Facility/Department: [facility] MED SURG U807/Q906-25    NAME: Raegan James    : 1960 (63 y.o.)  MRN: [id number]    Account: [de-identified]  Gender: male      PT evaluation and treatment orders received. Chart reviewed. PT evaluation attempted. Pt and pt's family currently declining therapy in house. Pt's spouse would like to speak to medical regarding current PT order. RN present and witnessing conversation. Perfect serve to Vimal Patel MD to reorder PT if pt's situation changes and eval is necessary for DC planning purposes.        Florencia Alanis, PT, 20 at 9:42 AM

## 2020-01-14 NOTE — CONSULTS
Pulmonary Medicine  Consult Note      Reason for consultation: Pneumonia    Consulting physician:    HISTORY OF PRESENT ILLNESS:      This is 67-year-old male with past medical history significant for diabetes mellitus, hypertension was presented with complaint of shortness of breath, fatigue and persistent cough for more than  7 days. He has complaint of myalgia, cough nasal congestion and extreme fatigue when symptoms started. He went to urgent care and work-up for influenza was negative at that time. He is running low-grade fever. Chest x-ray shows left basilar infiltration versus atelectasis. Patient was having persistent fever and chills throughout the day and he was quite slow to respond   He has complaint of lightheadedness and dizzy and he had syncopal episode at home. He was brought to ER. CT brain was done which was negative. I was consulted regarding pneumonia. Chest x-ray shows left basilar infiltration versus atelectasis. He was started on IV Rocephin and Zithromax along with Tamiflu for influenza. Patient said he is very uncomfortable in bed here. He is feeling better since he is admitted and wants to go home today. He does not want to stay here 1 more day. He denies having short of breath. His room air O2 saturation is 94%. He has no complaint of chest pain or pleuritic pain. No hemoptysis. No nausea vomiting or diarrhea. Past Medical History:        Diagnosis Date    Diabetes mellitus ([address]     Hypertension     TBI (traumatic brain injury) ([facility] [address]        Past Surgical History:        Procedure Laterality Date    APPENDECTOMY      BACK SURGERY      x 3    TONSILLECTOMY         Social History:     reports that he has never smoked. He does not have any smokeless tobacco history on file. He reports that he does not drink alcohol or use drugs. Family History:   History reviewed. No pertinent family history. Allergies:  Patient has no known allergies.         MEDICATIONS * 91*      Recent Labs     01/13/20  1555 01/14/20  0640    132*   K 3.9 3.4   CL 93* 93*   CO2 29 28   BUN 19 22*   CREATININE 1.06 1.13   GLUCOSE 265* 217*       MV Settings: ABGs: No results for input(s): PHART, OCQ3MFB, PO2ART, NRZ6SBO, BEART, G8YAFVRV, LXX6LXM in the last 72 hours. O2 Device: None (Room air)  Lab Results   Component Value Date    LACTA 1.7 01/14/2020    LACTA 2.3 01/13/2020    LACTA 2.8 01/13/2020       Radiology  Ct Head Wo Contrast    Result Date: 1/13/2020  EXAMINATION: CT of the brain without contrast HISTORY: Syncopal episode resulting in fall striking back of head. COMPARISON: CT brain from April 15, 2016 TECHNIQUE: Multiple contiguous axial images were obtained of the brain from the skull base through the vertex. Multiplanar reformats were obtained. FINDINGS: Prominence of the sulci and ventricles compatible with moderate generalized parenchymal volume loss. Gray-white matter differentiation is preserved. Areas of bilateral supratentorial white matter hypoattenuation are nonspecific but most likely related to  chronic small vessel ischemic changes in a patient of this age. Unchanged encephalomalacia of the right frontal lobe and left parietal lobe. Cavum of septum pellucidum noted. No acute hemorrhage or abnormal extra-axial fluid collection. No mass effect or midline shift. Negative cisterna magna again noted. The visualized paranasal sinuses and mastoid air cells are clear. Calvarium is intact. No acute intracranial process or significant interval change. All CT scans at this facility use dose modulation, iterative reconstruction, and/or weight based dosing when appropriate to reduce radiation dose to as low as reasonably achievable. Xr Chest Portable    Result Date: 1/13/2020  Portable chest radiograph History: Fever and fatigue. Technique: AP portable view of the chest obtained.  Comparison: Portable chest radiograph from April 15, 2016 Findings: Suboptimal inspiration. The cardiomediastinal silhouette is within normal limits. Patchy left lung base opacities. No pneumothorax. No pleural effusion. Bones of the thorax appear intact. Postsurgical changes of cervical spine fusion. Patchy left lung base opacities may relate to atelectasis or minimal infiltrate. Assessment and  plan:     1. Influenza A  2. Left lower lobe pneumonia  3. Syncope  4. Dehydration  5. Acute encephalopathy  6. Diabetes mellitus  7. Hypertension    Patient was started on IV Rocephin and Zithromax for left lung infiltration and Tamiflu for influenza. He possibly has dehydration and hypotension causing dizziness, lightheadedness. CT brain was negative. Patient was seen by cardiology due to syncope and collapse. Patient said he is feeling better. He wants to go home and does not want to stay 1 more day to see improvement. He had low-grade fever 100.4 this morning. He said he will come back to ER if his condition worsen. Mandi Swain He is on room air O2 saturation 94% discussed with Dr. Ariana Blount. She wrote for antibiotic with Levaquin as an outpatient. He will go home with Tamiflu, Mucinex and Tessalon Perles. I recommend patient to follow-up with me in 2 weeks in office, earlier if needed.   Thank you for consult    Electronically signed by Alisha Bear MD, FCCP on 1/14/2020 at 2:57 PM

## 2020-01-14 NOTE — PROGRESS NOTES
Hospitalist Progress Note      PCP: Marissa Manning    Date of Admission: 1/13/2020    HPI:  Seen and examined at bedside. Feeling better today. Wants to go home. Cardio has ordered Echo. Awaiting pulm recs. If ok with pulm and normal Echo, he can be discharged home. Medications:  Reviewed    Infusion Medications    dextrose       Scheduled Medications    sodium chloride flush  10 mL Intravenous 2 times per day    enoxaparin  40 mg Subcutaneous Daily    cefTRIAXone (ROCEPHIN) IV  1 g Intravenous Q24H    oseltamivir  75 mg Oral BID    aspirin  81 mg Oral Daily    azithromycin  500 mg Intravenous Q24H    guaiFENesin  600 mg Oral BID    insulin lispro  0-6 Units Subcutaneous TID WC    insulin lispro  0-3 Units Subcutaneous Nightly     PRN Meds: sodium chloride flush, magnesium hydroxide, ondansetron, acetaminophen, glucose, dextrose, glucagon (rDNA), dextrose, benzonatate, ipratropium-albuterol      Intake/Output Summary (Last 24 hours) at 1/14/2020 1201  Last data filed at 1/14/2020 0927  Gross per 24 hour   Intake 1045 ml   Output --   Net 1045 ml       Exam:    /77   Pulse 101   Temp 97.8 °F (36.6 °C) (Oral)   Resp 16   Ht 6' 1\" (1.854 m)   Wt 245 lb (111.1 kg)   SpO2 94%   BMI 32.32 kg/m²     General appearance: No apparent distress, appears stated age and cooperative. HEENT: Pupils equal, round, and reactive to light. Conjunctivae/corneas clear. Neck: Supple, with full range of motion. No jugular venous distention. Trachea midline. Respiratory:  + bilateral rhonchi, rale R> L  Cardiovascular: Regular rate and rhythm with normal S1/S2 without murmurs, rubs or gallops. Abdomen: Soft, non-tender, non-distended with normal bowel sounds. Musculoskeletal: No clubbing, cyanosis or edema bilaterally. Full range of motion without deformity. Skin: Skin color, texture, turgor normal.  No rashes or lesions. Neuro: Non Focal. Symetrical motor and tone.  Nl Comprehension, Alert,awake and

## 2020-01-14 NOTE — PROGRESS NOTES
Pt is alert and oriented x4, x1 standby assist d/t fall at home. Tessalon perles administered for non-productive cough tonight. Stated to me that he is no longer lightheaded when he is walking.

## 2020-01-14 NOTE — CONSULTS
Consult Note  Patient: Brad Castellanos  Unit/Bed: S589/U750-68  YOB: 1960  MRN: [id number]  Acct: [de-identified]   Admitting Diagnosis: Pneumonia [J18.9]  Date:  1/13/2020  Hospital Day: 1      Chief Complaint:  Dizziness and near syncope. History of Present Illness:  Patient was hospitalised for influenza A and pneumonia. Because of increased shortness of breath and near syncope. Cardiology was consulted. Patient had a history of prior head injury and has not been working since age of 40. His wife was in the room and had a detailed history of him. He sees Dr. Mima Engle at [facility].   Echocardiogram is pending    No Known Allergies    Current Facility-Administered Medications   Medication Dose Route Frequency Provider Last Rate Last Dose    sodium chloride flush 0.9 % injection 10 mL  10 mL Intravenous 2 times per day Jeneane Grooms, DO   10 mL at 01/14/20 0928    sodium chloride flush 0.9 % injection 10 mL  10 mL Intravenous PRN Jeneane Grooms, DO        magnesium hydroxide (MILK OF MAGNESIA) 400 MG/5ML suspension 30 mL  30 mL Oral Daily PRN Jeneane Grooms, DO        ondansetron [facility]) injection 4 mg  4 mg Intravenous Q6H PRN Jeneane Grooms, DO        enoxaparin (LOVENOX) injection 40 mg  40 mg Subcutaneous Daily Jeneane Grooms, DO   40 mg at 01/13/20 2049    acetaminophen (TYLENOL) tablet 650 mg  650 mg Oral Q4H PRN Jeneane Grooms, DO   650 mg at 01/14/20 1003    cefTRIAXone (ROCEPHIN) 1 g IVPB in 50 mL D5W minibag  1 g Intravenous Q24H Jeneane Grooms, DO        oseltamivir (TAMIFLU) capsule 75 mg  75 mg Oral BID Jeneane Grooms, DO   75 mg at 01/14/20 0927    glucose (GLUTOSE) 40 % oral gel 15 g  15 g Oral PRN Jeneane Grooms, DO        dextrose 50 % IV solution  12.5 g Intravenous PRN Jeneane Grooms, DO        glucagon (rDNA) injection 1 mg  1 mg Intramuscular PRN Jeneane Grooms, DO        dextrose 5 % solution  100 mL/hr Intravenous PRN Jeneane Grooms, DO        aspirin EC tablet 81 mg  81 mg Oral Daily 01/14/2020    GFRAA >60.0 01/14/2020    LABGLOM >60.0 01/14/2020    GLUCOSE 217 01/14/2020     Magnesium:    Lab Results   Component Value Date    MG 1.8 01/14/2020     Troponin:    Lab Results   Component Value Date    TROPONINI <0.010 01/13/2020       Radiology:  Ct Head Wo Contrast    Result Date: 1/13/2020  EXAMINATION: CT of the brain without contrast HISTORY: Syncopal episode resulting in fall striking back of head. COMPARISON: CT brain from April 15, 2016 TECHNIQUE: Multiple contiguous axial images were obtained of the brain from the skull base through the vertex. Multiplanar reformats were obtained. FINDINGS: Prominence of the sulci and ventricles compatible with moderate generalized parenchymal volume loss. Gray-white matter differentiation is preserved. Areas of bilateral supratentorial white matter hypoattenuation are nonspecific but most likely related to  chronic small vessel ischemic changes in a patient of this age. Unchanged encephalomalacia of the right frontal lobe and left parietal lobe. Cavum of septum pellucidum noted. No acute hemorrhage or abnormal extra-axial fluid collection. No mass effect or midline shift. Negative cisterna magna again noted. The visualized paranasal sinuses and mastoid air cells are clear. Calvarium is intact. No acute intracranial process or significant interval change. All CT scans at this facility use dose modulation, iterative reconstruction, and/or weight based dosing when appropriate to reduce radiation dose to as low as reasonably achievable. Xr Chest Portable    Result Date: 1/13/2020  Portable chest radiograph History: Fever and fatigue. Technique: AP portable view of the chest obtained. Comparison: Portable chest radiograph from April 15, 2016 Findings: Suboptimal inspiration. The cardiomediastinal silhouette is within normal limits. Patchy left lung base opacities. No pneumothorax. No pleural effusion. Bones of the thorax appear intact.  Postsurgical changes of cervical spine fusion. Patchy left lung base opacities may relate to atelectasis or minimal infiltrate. EKG:   Assessment:    Active Hospital Problems    Diagnosis Date Noted    Pneumonia [J18.9] 01/13/2020          Plan:  1. Follow-up with primary care and cardiologist at [facility]. Echo is pending.             Electronically signed by Kavya Eldridge MD on 1/14/2020 at 12:50 PM

## 2020-01-18 LAB
BLOOD CULTURE, ROUTINE: NORMAL
CULTURE, BLOOD 2: NORMAL

## 2024-09-30 ENCOUNTER — APPOINTMENT (OUTPATIENT)
Dept: ORTHOPEDIC SURGERY | Facility: CLINIC | Age: 64
End: 2024-09-30
Payer: COMMERCIAL